# Patient Record
(demographics unavailable — no encounter records)

---

## 2025-04-21 NOTE — PHYSICAL EXAM
[Normal] : mucosa is normal [Midline] : trachea located in midline position [Removed] : palatine tonsils previously removed [de-identified] : gait steady

## 2025-04-21 NOTE — HISTORY OF PRESENT ILLNESS
[de-identified] : 20 y/o F p/w sore throat (right side only) for the past week. States that symptoms resolved x2 days ago. Does not currently feel pain. States pain worsens when swallowing or with lateral flexion to left. Denies recent URI, fever, dysphagia, h/o recurrent tonsillitis. Denies GI upset. Denies nasal congestion.

## 2025-04-21 NOTE — ASSESSMENT
[FreeTextEntry1] : 1. pharyngitis - reassured appeared resolved - exam nl; she stated she was concerned and wanted to make sure as much as possible that it did not recur -throat culture sent - will call patient with results when received follow up in the future if problem recurs or has new ent problems